# Patient Record
Sex: FEMALE | HISPANIC OR LATINO | URBAN - METROPOLITAN AREA
[De-identification: names, ages, dates, MRNs, and addresses within clinical notes are randomized per-mention and may not be internally consistent; named-entity substitution may affect disease eponyms.]

---

## 2017-11-26 ENCOUNTER — INPATIENT (INPATIENT)
Facility: HOSPITAL | Age: 62
LOS: 0 days | Discharge: HOME | End: 2017-11-27
Attending: INTERNAL MEDICINE | Admitting: INTERNAL MEDICINE
Payer: COMMERCIAL

## 2017-11-26 VITALS
HEIGHT: 61 IN | RESPIRATION RATE: 16 BRPM | SYSTOLIC BLOOD PRESSURE: 111 MMHG | WEIGHT: 111.99 LBS | TEMPERATURE: 97 F | HEART RATE: 81 BPM | DIASTOLIC BLOOD PRESSURE: 57 MMHG | OXYGEN SATURATION: 100 %

## 2017-11-26 DIAGNOSIS — Z98.51 TUBAL LIGATION STATUS: Chronic | ICD-10-CM

## 2017-11-26 DIAGNOSIS — Z98.890 OTHER SPECIFIED POSTPROCEDURAL STATES: Chronic | ICD-10-CM

## 2017-11-26 LAB
ALBUMIN SERPL ELPH-MCNC: 3.9 G/DL — SIGNIFICANT CHANGE UP (ref 3.3–5)
ALP SERPL-CCNC: 45 U/L — SIGNIFICANT CHANGE UP (ref 40–120)
ALT FLD-CCNC: 20 U/L — SIGNIFICANT CHANGE UP (ref 12–78)
ANION GAP SERPL CALC-SCNC: 8 MMOL/L — SIGNIFICANT CHANGE UP (ref 5–17)
APPEARANCE UR: CLEAR — SIGNIFICANT CHANGE UP
APTT BLD: 26.7 SEC — LOW (ref 27.5–37.4)
AST SERPL-CCNC: 15 U/L — SIGNIFICANT CHANGE UP (ref 15–37)
BACTERIA # UR AUTO: (no result)
BASOPHILS # BLD AUTO: 0 K/UL — SIGNIFICANT CHANGE UP (ref 0–0.2)
BASOPHILS NFR BLD AUTO: 0.2 % — SIGNIFICANT CHANGE UP (ref 0–2)
BILIRUB SERPL-MCNC: 0.7 MG/DL — SIGNIFICANT CHANGE UP (ref 0.2–1.2)
BILIRUB UR-MCNC: NEGATIVE — SIGNIFICANT CHANGE UP
BUN SERPL-MCNC: 19 MG/DL — SIGNIFICANT CHANGE UP (ref 7–23)
CALCIUM SERPL-MCNC: 8.2 MG/DL — LOW (ref 8.5–10.1)
CHLORIDE SERPL-SCNC: 109 MMOL/L — HIGH (ref 96–108)
CO2 SERPL-SCNC: 25 MMOL/L — SIGNIFICANT CHANGE UP (ref 22–31)
COLOR SPEC: YELLOW — SIGNIFICANT CHANGE UP
CREAT SERPL-MCNC: 0.63 MG/DL — SIGNIFICANT CHANGE UP (ref 0.5–1.3)
DIFF PNL FLD: (no result)
EOSINOPHIL # BLD AUTO: 0 K/UL — SIGNIFICANT CHANGE UP (ref 0–0.5)
EOSINOPHIL NFR BLD AUTO: 0.5 % — SIGNIFICANT CHANGE UP (ref 0–6)
EPI CELLS # UR: SIGNIFICANT CHANGE UP
GLUCOSE SERPL-MCNC: 112 MG/DL — HIGH (ref 70–99)
GLUCOSE UR QL: NEGATIVE MG/DL — SIGNIFICANT CHANGE UP
HCT VFR BLD CALC: 34.6 % — SIGNIFICANT CHANGE UP (ref 34.5–45)
HGB BLD-MCNC: 12.3 G/DL — SIGNIFICANT CHANGE UP (ref 11.5–15.5)
INR BLD: 1.03 RATIO — SIGNIFICANT CHANGE UP (ref 0.88–1.16)
KETONES UR-MCNC: NEGATIVE — SIGNIFICANT CHANGE UP
LACTATE SERPL-SCNC: 0.7 MMOL/L — SIGNIFICANT CHANGE UP (ref 0.7–2)
LEUKOCYTE ESTERASE UR-ACNC: (no result)
LIDOCAIN IGE QN: 89 U/L — SIGNIFICANT CHANGE UP (ref 73–393)
LYMPHOCYTES # BLD AUTO: 0.9 K/UL — LOW (ref 1–3.3)
LYMPHOCYTES # BLD AUTO: 13.2 % — SIGNIFICANT CHANGE UP (ref 13–44)
MCHC RBC-ENTMCNC: 32.1 PG — SIGNIFICANT CHANGE UP (ref 27–34)
MCHC RBC-ENTMCNC: 35.6 GM/DL — SIGNIFICANT CHANGE UP (ref 32–36)
MCV RBC AUTO: 90.2 FL — SIGNIFICANT CHANGE UP (ref 80–100)
MONOCYTES # BLD AUTO: 0.3 K/UL — SIGNIFICANT CHANGE UP (ref 0–0.9)
MONOCYTES NFR BLD AUTO: 4.1 % — SIGNIFICANT CHANGE UP (ref 2–14)
NEUTROPHILS # BLD AUTO: 5.4 K/UL — SIGNIFICANT CHANGE UP (ref 1.8–7.4)
NEUTROPHILS NFR BLD AUTO: 82 % — HIGH (ref 43–77)
NITRITE UR-MCNC: NEGATIVE — SIGNIFICANT CHANGE UP
NT-PROBNP SERPL-SCNC: 62 PG/ML — SIGNIFICANT CHANGE UP (ref 0–125)
PH UR: 6.5 — SIGNIFICANT CHANGE UP (ref 5–8)
PLATELET # BLD AUTO: 199 K/UL — SIGNIFICANT CHANGE UP (ref 150–400)
POTASSIUM SERPL-MCNC: 4.1 MMOL/L — SIGNIFICANT CHANGE UP (ref 3.5–5.3)
POTASSIUM SERPL-SCNC: 4.1 MMOL/L — SIGNIFICANT CHANGE UP (ref 3.5–5.3)
PROT SERPL-MCNC: 7.1 GM/DL — SIGNIFICANT CHANGE UP (ref 6–8.3)
PROT UR-MCNC: NEGATIVE MG/DL — SIGNIFICANT CHANGE UP
PROTHROM AB SERPL-ACNC: 11.1 SEC — SIGNIFICANT CHANGE UP (ref 9.8–12.7)
RBC # BLD: 3.84 M/UL — SIGNIFICANT CHANGE UP (ref 3.8–5.2)
RBC # FLD: 10.9 % — SIGNIFICANT CHANGE UP (ref 10.3–14.5)
RBC CASTS # UR COMP ASSIST: SIGNIFICANT CHANGE UP /HPF (ref 0–4)
SODIUM SERPL-SCNC: 142 MMOL/L — SIGNIFICANT CHANGE UP (ref 135–145)
SP GR SPEC: 1 — LOW (ref 1.01–1.02)
TROPONIN I SERPL-MCNC: <0.015 NG/ML — SIGNIFICANT CHANGE UP (ref 0.01–0.04)
TROPONIN I SERPL-MCNC: <0.015 NG/ML — SIGNIFICANT CHANGE UP (ref 0.01–0.04)
UROBILINOGEN FLD QL: NEGATIVE MG/DL — SIGNIFICANT CHANGE UP
WBC # BLD: 6.6 K/UL — SIGNIFICANT CHANGE UP (ref 3.8–10.5)
WBC # FLD AUTO: 6.6 K/UL — SIGNIFICANT CHANGE UP (ref 3.8–10.5)
WBC UR QL: SIGNIFICANT CHANGE UP

## 2017-11-26 PROCEDURE — 71010: CPT | Mod: 26

## 2017-11-26 PROCEDURE — 76700 US EXAM ABDOM COMPLETE: CPT | Mod: 26

## 2017-11-26 PROCEDURE — 93010 ELECTROCARDIOGRAM REPORT: CPT

## 2017-11-26 PROCEDURE — 70450 CT HEAD/BRAIN W/O DYE: CPT | Mod: 26

## 2017-11-26 PROCEDURE — 99285 EMERGENCY DEPT VISIT HI MDM: CPT

## 2017-11-26 RX ORDER — MORPHINE SULFATE 50 MG/1
4 CAPSULE, EXTENDED RELEASE ORAL ONCE
Qty: 0 | Refills: 0 | Status: DISCONTINUED | OUTPATIENT
Start: 2017-11-26 | End: 2017-11-26

## 2017-11-26 RX ORDER — ONDANSETRON 8 MG/1
4 TABLET, FILM COATED ORAL ONCE
Qty: 0 | Refills: 0 | Status: COMPLETED | OUTPATIENT
Start: 2017-11-26 | End: 2017-11-26

## 2017-11-26 RX ORDER — SODIUM CHLORIDE 9 MG/ML
1000 INJECTION INTRAMUSCULAR; INTRAVENOUS; SUBCUTANEOUS ONCE
Qty: 0 | Refills: 0 | Status: COMPLETED | OUTPATIENT
Start: 2017-11-26 | End: 2017-11-26

## 2017-11-26 RX ORDER — FAMOTIDINE 10 MG/ML
20 INJECTION INTRAVENOUS ONCE
Qty: 0 | Refills: 0 | Status: COMPLETED | OUTPATIENT
Start: 2017-11-26 | End: 2017-11-26

## 2017-11-26 RX ADMIN — SODIUM CHLORIDE 2000 MILLILITER(S): 9 INJECTION INTRAMUSCULAR; INTRAVENOUS; SUBCUTANEOUS at 15:52

## 2017-11-26 RX ADMIN — ONDANSETRON 4 MILLIGRAM(S): 8 TABLET, FILM COATED ORAL at 19:27

## 2017-11-26 RX ADMIN — Medication 30 MILLILITER(S): at 19:27

## 2017-11-26 RX ADMIN — FAMOTIDINE 20 MILLIGRAM(S): 10 INJECTION INTRAVENOUS at 17:42

## 2017-11-26 RX ADMIN — MORPHINE SULFATE 4 MILLIGRAM(S): 50 CAPSULE, EXTENDED RELEASE ORAL at 22:40

## 2017-11-26 RX ADMIN — ONDANSETRON 4 MILLIGRAM(S): 8 TABLET, FILM COATED ORAL at 22:40

## 2017-11-26 NOTE — ED ADULT TRIAGE NOTE - NS ED NURSE DIRECT TO ROOM YN
Chief Complaint   Patient presents with   • Follow-Up       HPI:  Summer Hatch is a 67 y.o. year old female here today for follow-up on acute symptoms. Last seen by Dr. Shaikh 12/28/16; given pred taper and zpak. Normally follows with Corin [facility]. She was evaluated at PCP office yesterday with lumbar/spine film completed due to complaint of back pain. Xray was clear except possible T12 fracture and disc disease. She was referred to Ortho surgeon. She was given Augmentin to start.  CXR 1/2/17 indicated no acute process. She remains with persistent cough and rare phlegm production. She notes runny nose. Dyspnea is slightly increased. She notes rattle in her chest and wheezing at night. She also has a history of osteoporosis diagnosed in [place] 4 yrs ago. She has not had therapy. She has had bone fractures of leg/ankle and back now. Advised PCP f/u for repeat testing and intiating therapy if needed. She also has a history of kidney disease. She is due for flu shot and repeat spirometry. She has nebulizer on hand but has not used it.    Summer Hatch is a 67 y.o. year old female history of Asthma/COPD.  Chest xray in November 2015 was clear. PFT's in January 2016 indicated an FEV1 of 1.53 L, 64% predicted with an FEV1/FVC ratio of 59 and a DLCO of 75% predicted. She has a history of tobacco abuse and quit smoking in 1998. She is compliant on Advair 250/50 bid, Spiriva daily and a Ventolin HFA inhaler as needed. She is on a PPI. She also uses a Flonase nasal spray. She feels her sinus drainage is stable and she denies purulent sinus drainage. She denies current reflux symptoms.     ROS: As per HPI and otherwise negative if not stated.    Past Medical History   Diagnosis Date   • Hypertension    • COPD    • Renal disorder    • Seizure disorder (CMS-HCC)    • Carpal tunnel syndrome    • Hypothyroidism 10/22/2013   • GERD (gastroesophageal reflux disease) 10/22/2013   • History of tobacco abuse 10/22/2013      "Quit 1998. Smoked one pack per day for 30 years.   • Asthma    • Bronchitis    • Osteoporosis    • Pneumonia        Past Surgical History   Procedure Laterality Date   • Nerve ulnar repair or explore     • Other orthopedic surgery     • Hip revision total  10/10/2013     Performed by Nils Starr M.D. at SUR[facility]ERY [facility]   • Incision and drainage orthopedic  10/10/2013     Performed by Nils Starr M.D. at [facility]   • Carpal tunnel release     • Hip revision total Right 11/5/2015     Procedure: Evacuation hematoma, revision total hip;  Surgeon: Nils Starr M.D.;  Location: SURGERY [facility];  Service:    • Tonsillectomy     • Appendectomy         Family History   Problem Relation Age of Onset   • Psychiatry Mother 47     suicide   • Alcohol/Drug Mother    • Cancer Father 72     Lung   • Alcohol/Drug Brother      Recovering       Social History     Social History   • Marital Status:      Spouse Name: N/A   • Number of Children: N/A   • Years of Education: N/A     Occupational History   • Not on file.     Social History Main Topics   • Smoking status: Former Smoker -- 1.00 packs/day for 30 years     Types: Cigarettes   • Smokeless tobacco: Never Used   • Alcohol Use: No   • Drug Use: No   • Sexual Activity: Not on file     Other Topics Concern   • Not on file     Social History Narrative       Allergies as of 01/18/2017 - [facility] as Reviewed 01/18/2017   Allergen Reaction Noted   • Tape  10/09/2013   • Tylenol Swelling 09/15/2016        @Vital signs for this encounter:  Filed Vitals:    01/18/17 1257   Height: 1.651 m (5' 5\")   Weight: 75.751 kg (167 lb)   Weight % change since last entry.: 0 %   BP: 132/84   Pulse: 82   BMI (Calculated): 27.79   Resp: 14   Temp: 36.6 °C (97.9 °F)   TempSrc: Temporal   O2 sat % room air: 93 %       Current medications as of today   Current Outpatient Prescriptions   Medication Sig Dispense Refill   • azithromycin (ZITHROMAX) 250 MG Tab  "     • baclofen (LIORESAL) 10 MG Tab      • amoxicillin-clavulanate (AUGMENTIN) 875-125 MG Tab Take 1 Tab by mouth 2 times a day. 20 Tab 0   • diazepam (VALIUM) 5 MG Tab Take 1 Tab by mouth every 12 hours as needed (muscle spasm). 6 Tab 0   • MethylPREDNISolone (MEDROL DOSEPAK) 4 MG Tablet Therapy Pack Sig: Per instructions on pack 1 Kit 0   • methocarbamol (ROBAXIN) 750 MG Tab Take 2 Tabs by mouth 4 times a day as needed (muscle spasm). 20 Tab 0   • SYNTHROID 100 MCG Tab TAKE 1 TABLET DAILY 90 Tab 3   • atorvastatin (LIPITOR) 40 MG Tab Take 1 Tab by mouth every day. 90 Tab 3   • fenofibrate (TRICOR) 145 MG Tab TAKE 1 TABLET DAILY 90 Tab 3   • aripiprazole (ABILIFY) 5 MG tablet TAKE 1 TABLET AT BEDTIME 90 Tab 3   • albuterol 108 (90 BASE) MCG/ACT Aero Soln inhalation aerosol Inhale 2 Puffs by mouth every 6 hours as needed for Shortness of Breath. 3 Inhaler 3   • albuterol (PROVENTIL) 2.5mg/3ml Nebu Soln solution for nebulization 3 mL by Nebulization route every four hours as needed for Shortness of Breath. 360 mL 5   • tramadol (ULTRAM) 50 MG Tab TAKE 1 TO 2 TABLETS BY MOUTH 2 TIMES DAILY AS NEEDED FOR SEVERE PAIN 90 Tab 5   • allopurinol (ZYLOPRIM) 100 MG Tab TAKE 1 TABLET DAILY 90 Tab 3   • SPIRIVA HANDIHALER 18 MCG Cap INHALE THE CONTENTS OF ONE CAPSULE DAILY 90 Cap 3   • lisinopril (PRINIVIL) 10 MG Tab Take 1 Tab by mouth every day. 90 Tab 3   • omeprazole (PRILOSEC) 20 MG delayed-release capsule Take 1 Cap by mouth every morning. Indications: GERD-Related Laryngitis 90 Cap 3   • duloxetine (CYMBALTA) 60 MG Cap DR Particles delayed-release capsule TAKE 1 CAPSULE EVERY       MORNING 90 Cap 3   • triamterene-hctz (MAXZIDE-25/DYAZIDE) 37.5-25 MG Tab TAKE 1 TABLET EVERY MORNING 90 Tab 3   • niacin (NIASPAN) 1000 MG CR tablet TAKE 1 TABLET DAILY 90 Tab 3   • fluticasone (FLONASE) 50 MCG/ACT nasal spray Spray 1-2 Sprays in nose every day. 3 Bottle 3   • fluticasone-salmeterol (ADVAIR) 250-50 MCG/DOSE AEROSOL POWDER,  BREATH ACTIVATED Inhale 1 Puff by mouth every 12 hours. 3 Inhaler 3   • docusate sodium 100 MG Cap Take 100 mg by mouth every morning. 60 Cap 3   • latanoprost (XALATAN) 0.005 % Solution Place 1 Drop in both eyes every evening. 1 Bottle 3   • aspirin (ASA) 325 MG TABS Take 325 mg by mouth every day.     • predniSONE (DELTASONE) 10 MG Tab Take 30mg x 3 days, then take 20mg x 3 days, then take 10mg x 3 days, with food, then discontinue. 18 Tab 2   • cyclobenzaprine (FLEXERIL) 10 MG Tab Take 1 Tab by mouth 3 times a day as needed. 30 Tab 0   • duloxetine (CYMBALTA) 60 MG Cap DR Particles delayed-release capsule TAKE 1 CAPSULE EVERY       MORNING 90 Cap 3   • aripiprazole (ABILIFY) 5 MG tablet TAKE 1 TABLET AT BEDTIME 90 Tab 3   • Magnesium Oxide 500 MG Tab Take 1 Tab by mouth PRN.     • calcium carbonate 1000 MG Chew Tab Take 1 Tab by mouth 2 Times a Day. 30 Tab 3   • hydrocodone-acetaminophen (NORCO) 5-325 MG Tab per tablet Take 1-2 Tabs by mouth every four hours as needed.       No current facility-administered medications for this visit.         Physical Exam:   Gen:           Alert and oriented, No apparent distress. Mood and affect appropriate, normal interaction with examiner.  Eyes:          PERRL, EOM intact, sclere white, conjunctive moist. Glasses.  Ears:          Not examined.   Hearing:     Grossly intact.  Nose:          Normal, no lesions or deformities.  Dentition:    Good dentition.  Oropharynx:   Tongue normal, posterior pharynx without erythema or exudate.  Mallampati Classification: 3  Neck:        Supple, trachea midline, no masses.  Respiratory Effort: No intercostal retractions or use of accessory muscles.   Lung Auscultation:      Clear to auscultation bilaterally; no rales, but wheezing t/o.  CV:            Regular rate and rhythm. No murmurs, rubs or gallops.  Abd:           Not examined.  Lymphadenopathy: Not examined.  Gait and Station: Uses cane.  Digits and Nails: No clubbing, cyanosis,  Yes petechiae, or nodes.   Cranial Nerves: II-XII grossly intact.  Skin:        No rashes, lesions or ulcers noted.               Ext:           No cyanosis or edema.      Assessment:  1. Chronic obstructive pulmonary disease with acute exacerbation (CMS-HCC)     2. Bronchitis     3. History of tobacco abuse     4. Gastroesophageal reflux disease, esophagitis presence not specified     5. Seasonal allergic rhinitis, unspecified allergic rhinitis trigger         Immunizations:    Flu:pending injection  Pneumovax 23:2013  Prevnar 13:2015    Plan:  1. Continue Augmentin and add daily probiotic.  2. Start medrol dosepak and tessalon perles prn.  3. Spirometry and flu vaccine once symptoms resolved.  4. Recommend PCP performs bone density testing.  5. Continue PPI QD.  6. Discussed respiratory hygiene.  7. Follow up in 2 weeks with APRN to check symptoms, sooner if needed.

## 2017-11-26 NOTE — H&P ADULT - NSHPPHYSICALEXAM_GEN_ALL_CORE
ICU Vital Signs Last 24 Hrs    T(F): 97.4 (26 Nov 2017 14:46), Max: 97.4 (26 Nov 2017 14:46)    HR: 81 (26 Nov 2017 14:46) (81 - 81)    BP: 111/57 (26 Nov 2017 14:46) (111/57 - 111/57)    RR: 16    SpO2: 100%

## 2017-11-26 NOTE — ED PROVIDER NOTE - OBJECTIVE STATEMENT
61 y/o F with PMHx of tubal ligation, s/p ovarian cyst removal, osteoporosis, migraines on Zolmitriptan  presents to the ED s/p syncopal episode today. Pt woke up this morning feeling nausea, and then when she was shopping today she felt abd pain, diaphoresis, nausea. Pt then had a syncopal episode. Syncopal episode lasted a few seconds and reports weakness, and not being able to move her hands or legs briefly after. Pt ate a bowl of soup for lunch, a bagel and only had a cup of tea to drink today. Pt currently reports nausea, dry mouth, feeling like there are "rocks" in her stomach. Pt usually feels the abd pain, "rocks" in her stomach when she takes too many of her Zolmitriptan pills as she believes she has a sensitive stomach. Pt is supposed to take Zolmitriptan 5mg with max of 2 a day prn and pt has been taking it daily. Pt denies fever, CP, SOB, myalgias, cough, congestion. PMD: Dr. Taj Javed, had cardiac testing done.

## 2017-11-26 NOTE — ED PROVIDER NOTE - PROGRESS NOTE DETAILS
I, Wong Yeh, was the scribe for and in the presence of Dr. Sweet for this patient for the following sections: Progress Note, Disposition. Pt is now feeling nauseated with lightheadedness.

## 2017-11-26 NOTE — H&P ADULT - HISTORY OF PRESENT ILLNESS
Pt is a  63 y/o woman with PMHx of tubal ligation, s/p ovarian cyst removal, osteoporosis, migraines on Zolmitriptan  presents to the ED s/p syncopal episode today. Pt woke up this morning feeling nausea, and then when she was shopping today she felt abd pain, diaphoresis, nausea. Pt then had a syncopal episode. Syncopal episode lasted a few seconds and reports weakness, and not being able to move her hands or legs briefly after. Pt ate a bowl of soup for lunch, a bagel and only had a cup of tea to drink today. Pt currently reports nausea, dry mouth, feeling like there are "rocks" in her stomach. Pt usually feels the abd pain, "rocks" in her stomach when she takes too many of her Zolmitriptan pills as she believes she has a sensitive stomach. Pt is supposed to take Zolmitriptan 5mg with max of 2 a day prn and pt has been taking it daily. Pt denies fever, CP, SOB, myalgias, cough, congestion. Pt is a  63 y/o woman with PMHx of tubal ligation, s/p ovarian cyst removal, osteoporosis, migraines on Zolmitriptan who presents with syncope.   She reports having "stomach" problems and gall bladder issues for which she usually is careful with her diet.  However this last week over thanksgiving she has been less careful and "cheated" on her diet a few times.    This morning pt had tea and a buttered roll for breakfast.  At 11 am she was feeling nausea, and afterwards had chicken soup and some tea.   While shopping today around 12 noon she felt light headed, warm/diaphoretic, and as though she would have diarrhea.  Pt then had a syncopal episode which lasted a few seconds.  In the ER, pt reported nausea, dry mouth, feeling like there are "rocks" in her stomach with pain 7/10 radiating to her back.  She did have some loose light colored stool.    She reports having recent stomach discomfort off and on due to her migraine medication Zolmitriptan.   Pt denies resp complaints, no fever/chills, CP, SOB, myalgias, cough.  She reports a scratchy throat last week.      She reports a hx of colon polyps and H pylori.    Fam Hx:  Daughter DM I  Father CABG in his 60s

## 2017-11-26 NOTE — H&P ADULT - ASSESSMENT
Pt is admitted w/    I. Syncope pre-ceded by epigastric discomfort and nausea DDX: CAD, Arrythmia  - telemetry  - repeat EKGs and troponins  - cardiology consult    II. DVT prophylaxis  - heparin Pt is a  61 y/o woman with PMHx of tubal ligation, s/p ovarian cyst removal, osteoporosis, migraines on Zolmitriptan who presents with syncope.   She reports having "stomach" problems and gall bladder issues for which she usually is careful with her diet.  However this last week over thanksgiving she has been less careful and "cheated" on her diet a few times.    This morning pt had tea and a buttered roll for breakfast.  At 11 am she was feeling nausea, and afterwards had chicken soup and some tea.   While shopping today around 12 noon she felt light headed, warm/diaphoretic, and as though she would have diarrhea.  Pt then had a syncopal episode which lasted a few seconds.  In the ER, pt reported nausea, dry mouth, feeling like there are "rocks" in her stomach with pain 7/10 radiating to her back.  She did have some loose light colored stool.    Pt is admitted w/    I. Syncope pre-ceded by epigastric discomfort,diaphoresis and nausea DDX:  Arrythmia,CAD, orthostatic hypotension  - telemetry  - repeat EKGs and troponins  - orthostatic vitals  - falls risk  - cardiology consult     II. Epigastric pain  - US:  no acute gall bladder dis  - CT scan Gastritis?  - GI consult  - add protonix    III. DVT prophylaxis  - heparin

## 2017-11-26 NOTE — ED ADULT TRIAGE NOTE - CHIEF COMPLAINT QUOTE
Pt. to the ED C/O Nausea and abdominal pain that radiates to the back- Pt. states recent hx. Gallbladder problems

## 2017-11-27 VITALS
SYSTOLIC BLOOD PRESSURE: 104 MMHG | RESPIRATION RATE: 18 BRPM | OXYGEN SATURATION: 99 % | DIASTOLIC BLOOD PRESSURE: 57 MMHG | HEART RATE: 68 BPM | TEMPERATURE: 98 F

## 2017-11-27 DIAGNOSIS — R55 SYNCOPE AND COLLAPSE: ICD-10-CM

## 2017-11-27 LAB
ANION GAP SERPL CALC-SCNC: 6 MMOL/L — SIGNIFICANT CHANGE UP (ref 5–17)
BUN SERPL-MCNC: 14 MG/DL — SIGNIFICANT CHANGE UP (ref 7–23)
CALCIUM SERPL-MCNC: 7.9 MG/DL — LOW (ref 8.5–10.1)
CHLORIDE SERPL-SCNC: 110 MMOL/L — HIGH (ref 96–108)
CHOLEST SERPL-MCNC: 125 MG/DL — SIGNIFICANT CHANGE UP (ref 10–199)
CO2 SERPL-SCNC: 26 MMOL/L — SIGNIFICANT CHANGE UP (ref 22–31)
CREAT SERPL-MCNC: 0.63 MG/DL — SIGNIFICANT CHANGE UP (ref 0.5–1.3)
CULTURE RESULTS: SIGNIFICANT CHANGE UP
GLUCOSE SERPL-MCNC: 95 MG/DL — SIGNIFICANT CHANGE UP (ref 70–99)
HDLC SERPL-MCNC: 48 MG/DL — SIGNIFICANT CHANGE UP (ref 40–125)
LIPID PNL WITH DIRECT LDL SERPL: 66 MG/DL — SIGNIFICANT CHANGE UP
POTASSIUM SERPL-MCNC: 3.5 MMOL/L — SIGNIFICANT CHANGE UP (ref 3.5–5.3)
POTASSIUM SERPL-SCNC: 3.5 MMOL/L — SIGNIFICANT CHANGE UP (ref 3.5–5.3)
SODIUM SERPL-SCNC: 142 MMOL/L — SIGNIFICANT CHANGE UP (ref 135–145)
SPECIMEN SOURCE: SIGNIFICANT CHANGE UP
TOTAL CHOLESTEROL/HDL RATIO MEASUREMENT: 2.6 RATIO — LOW (ref 3.3–7.1)
TRIGL SERPL-MCNC: 54 MG/DL — SIGNIFICANT CHANGE UP (ref 10–149)

## 2017-11-27 PROCEDURE — 74177 CT ABD & PELVIS W/CONTRAST: CPT | Mod: 26

## 2017-11-27 PROCEDURE — 99222 1ST HOSP IP/OBS MODERATE 55: CPT

## 2017-11-27 PROCEDURE — 93306 TTE W/DOPPLER COMPLETE: CPT | Mod: 26

## 2017-11-27 PROCEDURE — 93010 ELECTROCARDIOGRAM REPORT: CPT

## 2017-11-27 RX ORDER — ACETAMINOPHEN 500 MG
650 TABLET ORAL EVERY 6 HOURS
Qty: 0 | Refills: 0 | Status: DISCONTINUED | OUTPATIENT
Start: 2017-11-27 | End: 2017-11-27

## 2017-11-27 RX ORDER — DENOSUMAB 60 MG/ML
120 INJECTION SUBCUTANEOUS
Qty: 0 | Refills: 0 | COMMUNITY

## 2017-11-27 RX ORDER — PANTOPRAZOLE SODIUM 20 MG/1
1 TABLET, DELAYED RELEASE ORAL
Qty: 60 | Refills: 0 | OUTPATIENT
Start: 2017-11-27 | End: 2017-12-27

## 2017-11-27 RX ORDER — HEPARIN SODIUM 5000 [USP'U]/ML
5000 INJECTION INTRAVENOUS; SUBCUTANEOUS EVERY 12 HOURS
Qty: 0 | Refills: 0 | Status: DISCONTINUED | OUTPATIENT
Start: 2017-11-27 | End: 2017-11-27

## 2017-11-27 RX ORDER — PANTOPRAZOLE SODIUM 20 MG/1
40 TABLET, DELAYED RELEASE ORAL DAILY
Qty: 0 | Refills: 0 | Status: DISCONTINUED | OUTPATIENT
Start: 2017-11-27 | End: 2017-11-27

## 2017-11-27 RX ORDER — SODIUM CHLORIDE 9 MG/ML
1000 INJECTION INTRAMUSCULAR; INTRAVENOUS; SUBCUTANEOUS
Qty: 0 | Refills: 0 | Status: COMPLETED | OUTPATIENT
Start: 2017-11-27 | End: 2017-11-27

## 2017-11-27 RX ORDER — ZOLMITRIPTAN 5 MG/1
1 TABLET ORAL
Qty: 0 | Refills: 0 | COMMUNITY

## 2017-11-27 RX ORDER — ZOLMITRIPTAN 5 MG/1
5 TABLET ORAL
Qty: 0 | Refills: 0 | Status: DISCONTINUED | OUTPATIENT
Start: 2017-11-27 | End: 2017-11-27

## 2017-11-27 RX ADMIN — SODIUM CHLORIDE 125 MILLILITER(S): 9 INJECTION INTRAMUSCULAR; INTRAVENOUS; SUBCUTANEOUS at 10:44

## 2017-11-27 NOTE — DISCHARGE NOTE ADULT - PLAN OF CARE
take your medicine, follow up in cardiology and gi office for further management Keep yourself hydrated, follow up in GI for EGD/colonsocopy sooner, follow up on CT finding, to repeat CT scan of abdomen for follow

## 2017-11-27 NOTE — DISCHARGE NOTE ADULT - HOSPITAL COURSE
HPI:  Pt is a  61 y/o woman with PMHx of tubal ligation, s/p ovarian cyst removal, osteoporosis, migraines on Zolmitriptan who admitted with c/o loss of consiousness, sudden in onset, lasted for few seconds, not associated with any confusion, bladder and bowel incontinence, preceded by stomach discomfort after eating food     #Review of system- rest of review of systems are negative except as mentioned in HPI    PHYSICAL EXAM:    Vital Signs Last 24 Hrs  T(C): 36.7 (27 Nov 2017 12:03), Max: 37.4 (27 Nov 2017 00:04)  T(F): 98.1 (27 Nov 2017 12:03), Max: 99.3 (27 Nov 2017 00:04)  HR: 68 (27 Nov 2017 12:03) (68 - 94)  BP: 104/57 (27 Nov 2017 12:03) (102/63 - 119/71)  BP(mean): --  RR: 18 (27 Nov 2017 12:03) (16 - 18)  SpO2: 99% (27 Nov 2017 12:03) (98% - 100%)    GENERAL: comfortable   HEAD:  Atraumatic, Normocephalic  EYES: EOMI, PERRLA, conjunctiva and sclera clear  HEENT: Moist mucous membranes  NECK: Supple, No JVD  NERVOUS SYSTEM:  Alert & Oriented X3, Motor Strength 5/5 B/L upper and lower extremities; DTRs 2+ intact and symmetric  CHEST/LUNG: Clear to auscultation bilaterally; No rales, rhonchi, wheezing, or rubs  HEART:S1S2 normal, no murmer  ABDOMEN: Soft, Nontender, Nondistended; Bowel sounds present  GENITOURINARY- Voiding, no palpable bladder  EXTREMITIES:  2+ Peripheral Pulses, No clubbing, cyanosis, or edema  MUSCULOSKELTAL- No muscle tenderness, Muscle tone normal, No joint tenderness, no Joint swelling, Joint range of motion-normal  SKIN-no rash, no lesion  PSYCH- Mood stable  LYMPH Node- No palpable lymph node    LABS:                        12.3   6.6   )-----------( 199      ( 26 Nov 2017 15:55 )             34.6     11-27    142  |  110<H>  |  14  ----------------------------<  95  3.5   |  26  |  0.63    Ca    7.9<L>      27 Nov 2017 07:52    TPro  7.1  /  Alb  3.9  /  TBili  0.7  /  DBili  x   /  AST  15  /  ALT  20  /  AlkPhos  45  11-26    PT/INR - ( 26 Nov 2017 15:55 )   PT: 11.1 sec;   INR: 1.03 ratio      MEDICATIONS  (STANDING):  heparin  Injectable 5000 Unit(s) SubCutaneous every 12 hours  pantoprazole   Suspension 40 milliGRAM(s) Oral daily    MEDICATIONS  (PRN):  acetaminophen   Tablet. 650 milliGRAM(s) Oral every 6 hours PRN Mild Pain (1 - 3)  ZOLMitriptan 5 milliGRAM(s) Oral every 2 hours PRN Migraine    A/P    #LOC- due to vasovagal syncope  -work up negative, echo as per Dr. Toledo appears normal , advised to d/c with further management as an outpt     #stomach pain- possibly from gastritis- pt has h/o H. Pyelor infection and follow up with her GI and has regular EGD, advised to follow up soon for possible another EGD and biopsy for follow up on thickening of her stomach area which has been found in CT scan. Also discussed with her about CT abdomen finding showing possibility of malignancy. I have educated her to follow up with gi for possible colonoscopy and repeatation of her CT abdomen for follow up. She understood me and verbalized me back    #mild Orthostatic vitals- s/p iv fluids, encourage po intake at home     #d/c today with further management as an outpt     #time spent more than 30 minutes HPI:  Pt is a  61 y/o woman with PMHx of tubal ligation, s/p ovarian cyst removal, osteoporosis, migraines on Zolmitriptan who admitted with c/o loss of consiousness, sudden in onset, lasted for few seconds, not associated with any confusion, bladder and bowel incontinence, preceded by stomach discomfort after eating food     #Review of system- rest of review of systems are negative except as mentioned in HPI    PHYSICAL EXAM:    Vital Signs Last 24 Hrs  T(C): 36.7 (27 Nov 2017 12:03), Max: 37.4 (27 Nov 2017 00:04)  T(F): 98.1 (27 Nov 2017 12:03), Max: 99.3 (27 Nov 2017 00:04)  HR: 68 (27 Nov 2017 12:03) (68 - 94)  BP: 104/57 (27 Nov 2017 12:03) (102/63 - 119/71)  BP(mean): --  RR: 18 (27 Nov 2017 12:03) (16 - 18)  SpO2: 99% (27 Nov 2017 12:03) (98% - 100%)    GENERAL: comfortable   HEAD:  Atraumatic, Normocephalic  EYES: EOMI, PERRLA, conjunctiva and sclera clear  HEENT: Moist mucous membranes  NECK: Supple, No JVD  NERVOUS SYSTEM:  Alert & Oriented X3, Motor Strength 5/5 B/L upper and lower extremities; DTRs 2+ intact and symmetric  CHEST/LUNG: Clear to auscultation bilaterally; No rales, rhonchi, wheezing, or rubs  HEART:S1S2 normal, no murmer  ABDOMEN: Soft, Nontender, Nondistended; Bowel sounds present  GENITOURINARY- Voiding, no palpable bladder  EXTREMITIES:  2+ Peripheral Pulses, No clubbing, cyanosis, or edema  MUSCULOSKELTAL- No muscle tenderness, Muscle tone normal, No joint tenderness, no Joint swelling, Joint range of motion-normal  SKIN-no rash, no lesion  PSYCH- Mood stable  LYMPH Node- No palpable lymph node    LABS:                        12.3   6.6   )-----------( 199      ( 26 Nov 2017 15:55 )             34.6     11-27    142  |  110<H>  |  14  ----------------------------<  95  3.5   |  26  |  0.63    Ca    7.9<L>      27 Nov 2017 07:52    TPro  7.1  /  Alb  3.9  /  TBili  0.7  /  DBili  x   /  AST  15  /  ALT  20  /  AlkPhos  45  11-26    PT/INR - ( 26 Nov 2017 15:55 )   PT: 11.1 sec;   INR: 1.03 ratio      MEDICATIONS  (STANDING):  heparin  Injectable 5000 Unit(s) SubCutaneous every 12 hours  pantoprazole   Suspension 40 milliGRAM(s) Oral daily    MEDICATIONS  (PRN):  acetaminophen   Tablet. 650 milliGRAM(s) Oral every 6 hours PRN Mild Pain (1 - 3)  ZOLMitriptan 5 milliGRAM(s) Oral every 2 hours PRN Migraine    A/P    #LOC- due to vasovagal syncope  -work up negative, echo as per Dr. Toledo appears normal , advised to d/c with further management as an outpt     #stomach pain- possibly from gastritis- pt has h/o H. Pyelor infection and follow up with her GI and has regular EGD, advised to follow up soon for possible another EGD and biopsy for follow up on thickening of her stomach area which has been found in CT scan. Also discussed with her about CT abdomen finding showing possibility of malignancy. I have educated her to follow up with gi for possible colonoscopy and repeatation of her CT abdomen for follow up. She understood me and verbalized me back. Advised to follow up within a week     Mesenteric panniculitis - as above, discussed with pt about follow up with GI and repeatation of CT and colonoscopy within a week     #mild Orthostatic vitals- s/p iv fluids, encourage po intake at home     #d/c today with further management as an outpt     #time spent more than 30 minutes

## 2017-11-27 NOTE — CONSULT NOTE ADULT - SUBJECTIVE AND OBJECTIVE BOX
CC Epig discomfort   HPI:  Pt is a  63 y/o woman with PMHx of tubal ligation, s/p ovarian cyst removal, osteoporosis, migraines on Zolmitriptan who presents with syncope. She had epigastric pain for hours associated with nausea but no vomiting  no blood in stool  hx bleeding ulcers and takes pantoprazole 40 mg daily Prn only  no heartburn or dysphagia \  no constipation  one loose stool yesterday  no chest pain   no palpitations.  no fever or chills  follows up with Primary Gi Dr. salvador  reports having gallbladder issues       egd with previous h pylori and colonoscopy 2015 benign colon polyps   She reports a hx of colon polyps and H pylori.    Fam Hx:  Daughter DM I  Father CABG in his 60s (26 Nov 2017 21:30)      PAST MEDICAL & SURGICAL HISTORY:  Migraine  Osteoporosis  H/O ovarian cystectomy  H/O tubal ligation      Allergies    No Known Allergies    Intolerances        MEDICATIONS  (STANDING):  heparin  Injectable 5000 Unit(s) SubCutaneous every 12 hours  pantoprazole   Suspension 40 milliGRAM(s) Oral daily    MEDICATIONS  (PRN):  acetaminophen   Tablet. 650 milliGRAM(s) Oral every 6 hours PRN Mild Pain (1 - 3)  ZOLMitriptan 5 milliGRAM(s) Oral every 2 hours PRN Migraine      FAMILY HISTORY:  n/a      Social History:    2 packs every 2 weeks         REVIEW OF SYSTEMS      General:	No fever or chills    Skin/Breast: No jaundice or rash   		  ENMT: denies sore throat or thrush    Respiratory and Thorax: Denies dyspnea or cough or shortness of breath  	  Cardiovascular: Denies chest pain or palpitations 	    Gastrointestinal: Denies jaundice or pruritis    Genitourinary: Denies dysuria or hematuria	    Musculoskeletal: Denies muscular pain or swelling	    Neurological: Denies confusion or tremor	    Hematology/Lymphatics: Denies easy bruising or bleeding 	    Endocrine:	Denies polyphagia or polyuria    See above hx otherwise neg for any major organ systems    PHYSICAL EXAM:    Vital Signs Last 24 Hrs  T(C): 36.7 (27 Nov 2017 12:03), Max: 37.4 (27 Nov 2017 00:04)  T(F): 98.1 (27 Nov 2017 12:03), Max: 99.3 (27 Nov 2017 00:04)  HR: 68 (27 Nov 2017 12:03) (68 - 94)  BP: 104/57 (27 Nov 2017 12:03) (102/63 - 119/71)  BP(mean): --  RR: 18 (27 Nov 2017 12:03) (16 - 18)  SpO2: 99% (27 Nov 2017 12:03) (98% - 100%)    Constitutional: no acute distress    ENMT: NC/AT scl anicteric opm pink no lesions     Neck: supple. No jvd or LN    Respiratory: Clear     Cardiovascular: RRR s1s2     Gastrointestinal: Pos bs , soft , not tender, no hepatosplenomegaly,  no mass      Back: No CVA tenderness    Extremities: NO cce     Neurological: Alert and oriented x 3     Skin: No rash or jaundice     Date/Time:11-27 @ 07:52    Albumin: --  ALT/SGPT: --  Alk Phos: --  AST/SGOT: --  Bilirubin Direct: --  Bilirubin Total: --  Ca: 7.9  eGFR : 113  eGFR Non-: 97  Lipase: --  Amylase: --  INR: --  PTT: --  Date/Time:11-26 @ 15:55    Albumin: 3.9  ALT/SGPT: 20  Alk Phos: 45  AST/SGOT: 15  Bilirubin Direct: --  Bilirubin Total: 0.7  Ca: 8.2  eGFR : 113  eGFR Non-: 97  Lipase: 89  Amylase: --  INR: 1.03  PTT: --      11-27    142  |  110<H>  |  14  ----------------------------<  95  3.5   |  26  |  0.63    Ca    7.9<L>      27 Nov 2017 07:52    TPro  7.1  /  Alb  3.9  /  TBili  0.7  /  DBili  x   /  AST  15  /  ALT  20  /  AlkPhos  45  11-26                            12.3   6.6   )-----------( 199      ( 26 Nov 2017 15:55 )             34.6   < from: CT Abdomen and Pelvis w/ IV Cont (11.27.17 @ 00:00) >    EXAM:  CT ABDOMEN AND PELVIS IC                            PROCEDURE DATE:  11/27/2017          INTERPRETATION:  CLINICAL INFORMATION: Epigastric abdominal pain.    PROCEDURE:   Helical axial images were obtained from the domes of the diaphragm   through the pubic symphysis following the administration of intravenous   contrast. 90 mls of Omnipaque-350 administered without complication. 10   mls discarded. Coronal and sagittal reformats were also obtained.       COMPARISON: Same day abdominal ultrasound.    FINDINGS:    LOWER CHEST: Subsegmental atelectasis.    LIVER: Scattered, multiple cysts measuring up to 3.8 x 5.1 cm and   subcentimeter hypodense foci, too small to characterize.  GALLBLADDER/BILE DUCTS: No intrahepatic or extrahepatic biliary   dilatation. No radiopaque gallstone.  PANCREAS: Unremarkable.  SPLEEN: Unremarkable.    ADRENALS: Unremarkable.  KIDNEYS/URETERS: No hydronephrosis, hydroureter or perinephric stranding.   BLADDER: Unremarkable.    REPRODUCTIVE ORGANS: Unremarkable.    BOWEL: No bowel obstruction. Unremarkable appendix. Mild wall thickening   of the gastric antrum/pylorus. Periampullary duodenal diverticulum.   PERITONEUM: No drainable fluid collection or free air. Trace pelvic free   fluid. Nonspecific stranding and subcentimeter lymph nodes along the   mesentery.  VESSELS: Normal caliber of the abdominal aorta.   RETROPERITONEUM: No lymphadenopathy.    ABDOMINAL WALL/SOFT TISSUES: Small fat-containing umbilical hernia.  BONES: Degenerative changes of the spine.     IMPRESSION:     Mild wall thickening of the gastric antrum/pylorus, which may be due to   partial distention. Recommend clinical correlation to assess   gastritis/pyloritis.     Nonspecific stranding and subcentimeter lymph nodes along the mesentery,   which can be seen in mesenteric panniculitis although neoplasm may have a   similar appearance. Recommend follow-up.                THOMAS MOTLEY   This document has been electronically signed. Nov 27 2017 12:30AM                < end of copied text >  < from: US Abdomen Complete (11.26.17 @ 20:22) >  EXAM:  US COMPLETE ABDOMEN SONOGRAM                            PROCEDURE DATE:  11/26/2017          INTERPRETATION:  Clinical indication:  Right upper quadrant/epigastric   pain.    Technique:  Abdominal ultrasound was performed.      Comparison: None.      Findings:  This study was technically difficult due to overlying bowel   gas.    LIVER: 12.6 cm in length. Septated cysts measuring 1.1 x 7.4 x 1.1 cm in   the left lobe and 4.4 x 5.0 x 3.6 cm in the right lobe.  BILIARY: No intrahepatic or extrahepatic biliary dilatation. The common   bile duct measuring up to 0.3 cm.   GALLBLADDER: No gallstone, significant gallbladder wall thickening, or   pericholecystic fluid. Negative sonographic Carlson sign.    PANCREAS: Poorly visualized due to bowel gas.    RIGHT KIDNEY: 10.9 cm in length. Mildly prominent collecting system. No   hydronephrosis or obvious renal stone.  LEFT KIDNEY: 10.3 cm in length. No hydronephrosis or obvious renal stone.  SPLEEN: 9.9 cm in length. Within normal limits.  ADDITIONAL: No ascites.     Impression:      No cholelithiasis or sonographic evidence of acute cholecystitis. Poorly   visualized pancreas.     Additional findings as described.       < end of copied text >

## 2017-11-27 NOTE — CONSULT NOTE ADULT - ASSESSMENT
Epig pain   r/o gastritis  liver cysts  hx h pylori   mesenteric stranding and lymphadenopathy    increaser pantoprazole 40 mg bid  advised the pt follow up with primary Gi within 2 weeks  Dr. Benavidez  Pt understands the significance of mesenteric lymphadenopathy and importance of follow up and repeat imaging.

## 2017-11-27 NOTE — DISCHARGE NOTE ADULT - CARE PROVIDER_API CALL
Juan David Toledo), Cardiovascular Disease  [address] [place] [address]  Phone: [phone]  Fax: [phone]    Carlos Allan), Gastroenterology; Internal Medicine  [address] [place] [address]  Phone: [phone]  Fax: [phone]

## 2017-11-27 NOTE — ED ADULT NURSE REASSESSMENT NOTE - COMFORT CARE
side rails up/wait time explained/darkened lights
plan of care explained/side rails up/wait time explained/warm blanket provided
plan of care explained/assisted to bathroom/side rails up/wait time explained
plan of care explained/side rails up/wait time explained

## 2017-11-27 NOTE — DISCHARGE NOTE ADULT - PATIENT PORTAL LINK FT
“You can access the Follow[facility] Patient Portal, offered by [facility], by registering with the following website: [email or web address]”

## 2017-11-27 NOTE — DISCHARGE NOTE ADULT - CARE PLAN
Principal Discharge DX:	Syncope and collapse  Goal:	take your medicine, follow up in cardiology and gi office for further management  Instructions for follow-up, activity and diet:	Keep yourself hydrated, follow up in GI for EGD/colonsocopy sooner, follow up on CT finding, to repeat CT scan of abdomen for follow

## 2017-11-27 NOTE — DISCHARGE NOTE ADULT - MEDICATION SUMMARY - MEDICATIONS TO TAKE
I will START or STAY ON the medications listed below when I get home from the hospital:    ZOLMitriptan 5 mg oral tablet  -- 1 tab(s) by mouth once a day, As Needed may repeat in 2 hours if migraine persists   -- Indication: For Home meds    Prolia 60 mg/mL subcutaneous solution  -- 120 milligram(s) subcutaneous every 6 months  -- Indication: For Home meds    pantoprazole 40 mg oral delayed release tablet  -- 1 tab(s) by mouth 2 times a day   -- It is very important that you take or use this exactly as directed.  Do not skip doses or discontinue unless directed by your doctor.  Obtain medical advice before taking any non-prescription drugs as some may affect the action of this medication.  Swallow whole.  Do not crush.    -- Indication: For possible gastritis     levoFLOXacin 500 mg oral tablet  -- 1 tab(s) by mouth every 24 hours    ***COURSE COMPLETED***  -- Indication: For Matthew meds

## 2017-11-27 NOTE — ED ADULT NURSE REASSESSMENT NOTE - GENERAL PATIENT STATE
resting/sleeping/comfortable appearance
comfortable appearance
comfortable appearance/family/SO at bedside
comfortable appearance/cooperative

## 2017-11-27 NOTE — CONSULT NOTE ADULT - PROBLEM SELECTOR RECOMMENDATION 9
Hx. c/w Vasovagal syncopal event with prodrome and a -2 EGSYS score.  Likely from abdominal pain and mild dehydration as pt. reports not drinking water at all the day prior to this presentation.  CV wise stable for discharge with outpatient follow up from GI and cardiac standpoint.  Increased fluid intake discussed.

## 2017-11-27 NOTE — CONSULT NOTE ADULT - SUBJECTIVE AND OBJECTIVE BOX
HPI:  Pt is a  63 y/o woman with PMHx of tubal ligation, s/p ovarian cyst removal, osteoporosis, migraines on Zolmitriptan who presents with syncope.   She reports having "stomach" problems and gall bladder issues for which she usually is careful with her diet.  However this last week over thanksgiving she has been less careful and "cheated" on her diet a few times.    This morning pt had tea and a buttered roll for breakfast.  At 11 am she was feeling nausea, and afterwards had chicken soup and some tea.   While shopping today around 12 noon she felt light headed, warm/diaphoretic, and as though she would have diarrhea.  Pt then had a syncopal episode which lasted a few seconds.  In the ER, pt reported nausea, dry mouth, feeling like there are "rocks" in her stomach with pain 7/10 radiating to her back.  She did have some loose light colored stool.    She reports having recent stomach discomfort off and on due to her migraine medication Zolmitriptan.   Pt denies resp complaints, no fever/chills, CP, SOB, myalgias, cough.  She reports a scratchy throat last week.      She reports a hx of colon polyps and H pylori.    Fam Hx:  Daughter DM I  Father CABG in his 60s (26 Nov 2017 21:30)        PAST MEDICAL & SURGICAL HISTORY:  Migraine  Osteoporosis  H/O ovarian cystectomy  H/O tubal ligation      SOCIAL HISTORY: Non-Smoker/Social ETOH/ No Ilicit Drug use.    FAMILY HISTORY:      Allergies    No Known Allergies    Intolerances        HOME MEDICATIONS:   Home Medications:  levoFLOXacin 500 mg oral tablet: 1 tab(s) orally every 24 hours    ***COURSE COMPLETED*** (26 Nov 2017 19:53)  Prolia 60 mg/mL subcutaneous solution: 120 milligram(s) subcutaneous every 6 months (26 Nov 2017 19:53)  ZOLMitriptan 5 mg oral tablet: 1 tab(s) orally once a day, As Needed may repeat in 2 hours if migraine persists  (26 Nov 2017 19:53)      HOSPITAL MEDICATIONS:   MEDICATIONS  (STANDING):  heparin  Injectable 5000 Unit(s) SubCutaneous every 12 hours  pantoprazole   Suspension 40 milliGRAM(s) Oral daily    MEDICATIONS  (PRN):  acetaminophen   Tablet. 650 milliGRAM(s) Oral every 6 hours PRN Mild Pain (1 - 3)  ZOLMitriptan 5 milliGRAM(s) Oral every 2 hours PRN Migraine      REVIEW OF SYSTEMS: 13 systems were reviewed and all negative except for comments above.    Vital Signs Last 24 Hrs  T(C): 36.7 (27 Nov 2017 12:03), Max: 37.4 (27 Nov 2017 00:04)  T(F): 98.1 (27 Nov 2017 12:03), Max: 99.3 (27 Nov 2017 00:04)  HR: 68 (27 Nov 2017 12:03) (68 - 94)  BP: 104/57 (27 Nov 2017 12:03) (102/63 - 119/71)  BP(mean): --  RR: 18 (27 Nov 2017 12:03) (16 - 18)  SpO2: 99% (27 Nov 2017 12:03) (98% - 100%)Daily Height in cm: 154.94 (26 Nov 2017 14:46)    Daily I&O's Summary      PHYSICAL EXAM:    Constitutional: NAD, awake and alert, well-developed  HEENT: PERRLA, EOMI,  No oral cyanosis. Oropharynx Clean and Dry.  Neck:  supple,  No JVD, No Thyroid enlargement. No Carotid Bruits bilaterally.  Respiratory: Breath sounds are clear bilaterally, No wheezing, rales or rhonchi  Cardiovascular: NL S1 and S2, RRR, no Murmurs, gallops or rubs  Gastrointestinal: Bowel Sounds present, soft, NT, ND, No HSM.   Extremities: No peripheral edema. No clubbing or cyanosis.  Barbeau Test performed in R+L UE and Negative.  Vascular: 2+ peripheral pulses in LE   Neurological: A/O x 3, no focal motor or sensory deficits  Musculoskeletal: no calf tenderness or joint swelling.  Skin: No rashes or lessions.      LABS: All Labs Reviewed:                        12.3   6.6   )-----------( 199      ( 26 Nov 2017 15:55 )             34.6     27 Nov 2017 07:52    142    |  110    |  14     ----------------------------<  95     3.5     |  26     |  0.63   26 Nov 2017 15:55    142    |  109    |  19     ----------------------------<  112    4.1     |  25     |  0.63     Ca    7.9        27 Nov 2017 07:52  Ca    8.2        26 Nov 2017 15:55    TPro  7.1    /  Alb  3.9    /  TBili  0.7    /  DBili  x      /  AST  15     /  ALT  20     /  AlkPhos  45     26 Nov 2017 15:55    PT/INR - ( 26 Nov 2017 15:55 )   PT: 11.1 sec;   INR: 1.03 ratio         PTT - ( 26 Nov 2017 15:55 )  PTT:26.7 sec  CARDIAC MARKERS ( 26 Nov 2017 18:24 )  <0.015 ng/mL / x     / x     / x     / x      CARDIAC MARKERS ( 26 Nov 2017 15:55 )  <0.015 ng/mL / x     / x     / x     / x          11-26 @ 15:55  Pro Bnp 62        RADIOLOGY:    EKG:    ECHO:    CARDIAC CATHTERIZATION/STRESS TEST: HPI:  Pt is a  61 y/o woman with PMHx osteoporosis, Irritable bowel syndrome, tubal ligation, s/p ovarian cyst removal, migraines on Zolmitriptan who presents with an episode of passing out.   She reports having "stomach" problems and gall bladder issues for which she usually is careful with her diet.  This last weekend over thanksgiving she has been less careful and ate many foods she does not normally. The day of admission patient awoke had tea and then a buttered roll for breakfast.  At 11 am she began feeling nausea and then had chicken soup and some tea.   She then went out shopping and while in store had acute onset of feeling light headed, diaphoretic, abdominal discomfort where she thought she would have diarrhea.  She then LOC and had a syncopal episode which lasted a few seconds. She did not injure himself and did have a mild prodrome as described above before passing out.  In the ER, pt reported nausea, dry mouth, feeling like there are "rocks" in her stomach with pain 7/10 radiating to her back.  She did have some loose light colored stool. She also regurgitated the chicken soup she ate. She reports having recent stomach discomfort off and on her whole life and also slightly worse when she takes her migraine medication.   Pt denies SOB, SHERIDAN.  fever/chills, CP, SOB, myalgias, cough. She reports a hx of colon polyps and and UEndo where H pylori was diagnosised and she was treated for this. Had a prior syncopal event when she had gastroenteritis    PAST MEDICAL & SURGICAL HISTORY:  IBS  Migraine  Osteoporosis  H/O ovarian cystectomy  H/O tubal ligation      SOCIAL HISTORY: 1-2 cigarettes a day of Smoking/No ETOH/ No Ilicit Drug use. Retired     FAMILY HISTORY:  Daughter DM I  Father CABG in his 60s      Allergies    No Known Allergies    Intolerances        HOME MEDICATIONS:   Home Medications:  Prolia 60 mg/mL subcutaneous solution: 120 milligram(s) subcutaneous every 6 months (26 Nov 2017 19:53)  ZOLMitriptan 5 mg oral tablet: 1 tab(s) orally once a day, As Needed may repeat in 2 hours if migraine persists  (26 Nov 2017 19:53)      HOSPITAL MEDICATIONS:   MEDICATIONS  (STANDING):  heparin  Injectable 5000 Unit(s) SubCutaneous every 12 hours  pantoprazole   Suspension 40 milliGRAM(s) Oral daily    MEDICATIONS  (PRN):  acetaminophen   Tablet. 650 milliGRAM(s) Oral every 6 hours PRN Mild Pain (1 - 3)  ZOLMitriptan 5 milliGRAM(s) Oral every 2 hours PRN Migraine      REVIEW OF SYSTEMS: 13 systems were reviewed and all negative except for comments above.    Vital Signs Last 24 Hrs  T(C): 36.7 (27 Nov 2017 12:03), Max: 37.4 (27 Nov 2017 00:04)  T(F): 98.1 (27 Nov 2017 12:03), Max: 99.3 (27 Nov 2017 00:04)  HR: 68 (27 Nov 2017 12:03) (68 - 94)  BP: 104/57 (27 Nov 2017 12:03) (102/63 - 119/71)  BP(mean): --  RR: 18 (27 Nov 2017 12:03) (16 - 18)  SpO2: 99% (27 Nov 2017 12:03) (98% - 100%)Daily Height in cm: 154.94 (26 Nov 2017 14:46)    Daily I&O's Summary      PHYSICAL EXAM:    Constitutional: NAD, awake and alert, well-developed  HEENT: PERRLA, EOMI,  No oral cyanosis. Oropharynx Clean and Dry.  Neck:  supple,  No JVD, No Thyroid enlargement. No Carotid Bruits bilaterally.  Respiratory: Breath sounds are clear bilaterally, No wheezing, rales or rhonchi  Cardiovascular: NL S1 and S2, RRR, no Murmurs, gallops or rubs  Gastrointestinal: Bowel Sounds present, soft, NT, ND, No HSM.   Extremities: No peripheral edema. No clubbing or cyanosis.  Barbeau Test performed in R+L UE and Negative.  Vascular: 2+ peripheral pulses in LE   Neurological: A/O x 3, no focal motor or sensory deficits  Musculoskeletal: no calf tenderness or joint swelling.  Skin: No rashes or lessions.      LABS: All Labs Reviewed:                        12.3   6.6   )-----------( 199      ( 26 Nov 2017 15:55 )             34.6     27 Nov 2017 07:52    142    |  110    |  14     ----------------------------<  95     3.5     |  26     |  0.63   26 Nov 2017 15:55    142    |  109    |  19     ----------------------------<  112    4.1     |  25     |  0.63     Ca    7.9        27 Nov 2017 07:52  Ca    8.2        26 Nov 2017 15:55    TPro  7.1    /  Alb  3.9    /  TBili  0.7    /  DBili  x      /  AST  15     /  ALT  20     /  AlkPhos  45     26 Nov 2017 15:55    PT/INR - ( 26 Nov 2017 15:55 )   PT: 11.1 sec;   INR: 1.03 ratio         PTT - ( 26 Nov 2017 15:55 )  PTT:26.7 sec  CARDIAC MARKERS ( 26 Nov 2017 18:24 )  <0.015 ng/mL / x     / x     / x     / x      CARDIAC MARKERS ( 26 Nov 2017 15:55 )  <0.015 ng/mL / x     / x     / x     / x          11-26 @ 15:55  Pro Bnp 62    Tele: NSR, No arrythmias.    EKG: NSR without any st-t changes (11/26 and 11/27).    RADIOLOGY:  < from: Xray Chest 1 View AP/PA. (11.26.17 @ 16:28) >  EXAM:  CHEST SINGLE VIEW FRONTAL                            PROCEDURE DATE:  11/26/2017          INTERPRETATION:  CHEST SINGLE VIEW FRONTAL    Single AP view    HISTORY:  syncope    Comparison:  none.    The cardiac silhouette is within normal limits. The lungs are clear. No   pleural abnormality. Bilateral nipple shadows are noted.    IMPRESSION: Normal AP chest.      < end of copied text >      ECHO:  preliminary review (full report to follow).  NL LV FX with mild MR and TR.    EGSYS Score: -2 (Low likely sultana of cardiac syncope).

## 2017-11-27 NOTE — ED ADULT NURSE REASSESSMENT NOTE - NS ED NURSE REASSESS COMMENT FT1
Patient resting comfortably in bed. Safety and comfort maintained. Will continue to monitor.
Patient received from QUE Vaughn. Patient resting comfortably in bed. Safety and comfort maintained. Will continue to monitor.
Patient resting comfortably in bed. Safety and comfort maintained. Will continue to monitor.

## 2017-11-29 DIAGNOSIS — K29.70 GASTRITIS, UNSPECIFIED, WITHOUT BLEEDING: ICD-10-CM

## 2017-11-29 DIAGNOSIS — E86.0 DEHYDRATION: ICD-10-CM

## 2017-11-29 DIAGNOSIS — I95.1 ORTHOSTATIC HYPOTENSION: ICD-10-CM

## 2017-11-29 DIAGNOSIS — K65.4 SCLEROSING MESENTERITIS: ICD-10-CM

## 2017-11-29 DIAGNOSIS — R55 SYNCOPE AND COLLAPSE: ICD-10-CM
